# Patient Record
Sex: FEMALE | Race: WHITE | Employment: UNEMPLOYED | URBAN - METROPOLITAN AREA
[De-identification: names, ages, dates, MRNs, and addresses within clinical notes are randomized per-mention and may not be internally consistent; named-entity substitution may affect disease eponyms.]

---

## 2017-01-03 ENCOUNTER — TELEPHONE (OUTPATIENT)
Dept: SURGERY | Facility: CLINIC | Age: 47
End: 2017-01-03

## 2017-01-03 NOTE — TELEPHONE ENCOUNTER
Spoke to patient who states she was unable to get full quantity of percocet Rx from pharmacy, that they were only able to dispense 60 tablets. Patient requesting remainder of percocet Rx, #30 for pick-up.   Advised patient of 48 hour refill policy, to which

## 2017-01-05 ENCOUNTER — TELEPHONE (OUTPATIENT)
Dept: SURGERY | Facility: CLINIC | Age: 47
End: 2017-01-05

## 2017-01-05 DIAGNOSIS — M54.16 RIGHT LUMBAR RADICULOPATHY: Primary | ICD-10-CM

## 2017-01-05 RX ORDER — CYCLOBENZAPRINE HCL 10 MG
10 TABLET ORAL 3 TIMES DAILY PRN
Qty: 90 TABLET | Refills: 0 | Status: SHIPPED | OUTPATIENT
Start: 2017-01-05 | End: 2017-01-11

## 2017-01-05 NOTE — TELEPHONE ENCOUNTER
Per Dr. Baltazar Spencer patient needs to be seen by him prior to more narcotics being prescribed. Informed patient of need for OV, patient became tearful, states she is in severe pain and \"needs to be seen today\".  Advised patient she can be seen by Dr. Nato Britton

## 2017-01-06 ENCOUNTER — LAB ENCOUNTER (OUTPATIENT)
Dept: LAB | Age: 47
End: 2017-01-06
Attending: PHYSICIAN ASSISTANT
Payer: MEDICAID

## 2017-01-06 ENCOUNTER — TELEPHONE (OUTPATIENT)
Dept: SURGERY | Facility: CLINIC | Age: 47
End: 2017-01-06

## 2017-01-06 DIAGNOSIS — E11.40 TYPE 2 DIABETES MELLITUS WITH DIABETIC NEUROPATHY, UNSPECIFIED LONG TERM INSULIN USE STATUS: ICD-10-CM

## 2017-01-06 DIAGNOSIS — I10 ESSENTIAL HYPERTENSION: ICD-10-CM

## 2017-01-06 DIAGNOSIS — I10 UNSPECIFIED ESSENTIAL HYPERTENSION: ICD-10-CM

## 2017-01-06 DIAGNOSIS — E78.00 PURE HYPERCHOLESTEROLEMIA: Primary | ICD-10-CM

## 2017-01-06 DIAGNOSIS — E78.49 OTHER HYPERLIPIDEMIA: ICD-10-CM

## 2017-01-06 DIAGNOSIS — F11.90 NARCOTIC DRUG USE: ICD-10-CM

## 2017-01-06 DIAGNOSIS — E11.40 DIABETIC NEUROPATHY (HCC): ICD-10-CM

## 2017-01-06 LAB
ALBUMIN SERPL-MCNC: 3.8 G/DL (ref 3.5–4.8)
ALP LIVER SERPL-CCNC: 77 U/L (ref 39–100)
ALT SERPL-CCNC: 14 U/L (ref 14–54)
AST SERPL-CCNC: 6 U/L (ref 15–41)
BILIRUB SERPL-MCNC: 0.3 MG/DL (ref 0.1–2)
BUN BLD-MCNC: 9 MG/DL (ref 8–20)
CALCIUM BLD-MCNC: 9.1 MG/DL (ref 8.3–10.3)
CHLORIDE: 104 MMOL/L (ref 101–111)
CHOLEST SMN-MCNC: 237 MG/DL (ref ?–200)
CO2: 28 MMOL/L (ref 22–32)
CREAT BLD-MCNC: 0.69 MG/DL (ref 0.55–1.02)
EST. AVERAGE GLUCOSE BLD GHB EST-MCNC: 163 MG/DL (ref 68–126)
GLUCOSE BLD-MCNC: 164 MG/DL (ref 70–99)
HBA1C MFR BLD HPLC: 7.3 % (ref ?–5.7)
HDLC SERPL-MCNC: 34 MG/DL (ref 45–?)
HDLC SERPL: 6.97 {RATIO} (ref ?–4.44)
LDLC SERPL CALC-MCNC: 131 MG/DL (ref ?–130)
M PROTEIN MFR SERPL ELPH: 7.8 G/DL (ref 6.1–8.3)
NONHDLC SERPL-MCNC: 203 MG/DL (ref ?–130)
POTASSIUM SERPL-SCNC: 3.7 MMOL/L (ref 3.6–5.1)
SODIUM SERPL-SCNC: 140 MMOL/L (ref 136–144)
TRIGLYCERIDES: 359 MG/DL (ref ?–150)
VLDL: 72 MG/DL (ref 5–40)

## 2017-01-06 PROCEDURE — 80053 COMPREHEN METABOLIC PANEL: CPT

## 2017-01-06 PROCEDURE — 80307 DRUG TEST PRSMV CHEM ANLYZR: CPT

## 2017-01-06 PROCEDURE — 36415 COLL VENOUS BLD VENIPUNCTURE: CPT

## 2017-01-06 PROCEDURE — 83036 HEMOGLOBIN GLYCOSYLATED A1C: CPT

## 2017-01-06 PROCEDURE — 80061 LIPID PANEL: CPT

## 2017-01-06 NOTE — PATIENT INSTRUCTIONS
Refill policies:    • Allow 2 business days for refills; controlled substances may take longer.   • Contact your pharmacy at least 5 days prior to running out of medication and have them send an electronic request or submit request through the “request re your physician has recommended that you have a procedure or additional testing performed. [facility] will contact your insurance carrier to obtain pre-certification or prior authorization.     Unfortunately, [facility] has seen an increas [employer].     Medication:   Number of days you need to be off for the following medications:  • Aggrenox 10 days   • Agrylin (Anagrelide) 10 days   • Enbrel (Etanercept) 24 hours   • Fragmin (Dalteparin) 24 hours   • Pletal (Cilostazol) 7 days  • Pradaxa Contact your primary care physician if your blood sugar rises as you may require some medication adjustment.   It is normal to have increased pain at injection site for up to 3-5 days after procedure, you can use heat for the first 24 hours (20 minutes on 2 Once in place, the injection is carried out. After the injection, the needle is removed and an adhesive bandage is applied. What should I expect after the sacroiliac injection?   Immediately after the sacroiliac injection, you may experience complete pain lasts for six months or more. If three injections have not helped, you may be a candidate for a different procedure. It is important to attend follow up appointments to clearly communicate with your provider how you are feeling.   Will the sacroiliac inject with turning the head or looking up. What is the purpose of a facet injection? The long acting anti-inflammatory medication, or steroid, that is injected reduces inflammation and swelling of the facet joint and other tissues surrounding the joint.  This m several days. You should start noticing pain relief starting the third day or so. Can I go back to work the next day? You should be able to go back to work the next day. Again it is normal to feel some soreness or aching at the injection site.    How lo • Insomnia   • Headache   • Water retention   • Increased appetite   • Increased heart rate   • Abdominal cramping or bloating   You should contact our office immediately if you experience any side effects.   These effects usually resolve within a few day done either with the patient on their side for most neck injections and with the patient on their stomach for back injections. Occasionally other positions are used to optimize the X-ray view.  All patients receiving sedation are monitored with EKG, blood p injections? In a six-month period, most patients do not receive more than three injections. This is because the effect of the medication injected frequently lasts for six months or more.  If three injections have not helped you , you may be a candidate for

## 2017-01-06 NOTE — PROGRESS NOTES
Pain management agreement reviewed with patient, patient verbalized understanding. Copy given to patient and original sent to scan.  Patient sent to lab for urine test.

## 2017-01-06 NOTE — TELEPHONE ENCOUNTER
Patient seen in [facility] with Dr. Natali Curiel 1/6/17. Scheduled at that time. Lyrica denied, per TE 12/20/17.

## 2017-01-07 NOTE — PROGRESS NOTES
Name: Mei Ley   : 1970   DOS: 2017     Pain Clinic Follow Up Visit:   Mei Ley is a 51year old female who presents for recheck of her chronic low back pain. She was seen by my nurse practitioner for a consultation.   She was p tablet (500 mg total) by mouth 2 (two) times daily with meals. Disp: 60 tablet Rfl: 1   ValACYclovir HCl 1 G Oral Tab Take 1 g by mouth every 12 (twelve) hours. Disp:  Rfl:    Sertraline HCl 100 MG Oral Tab Take 1 tablet (100 mg total) by mouth daily.  1 1/ capsule 0      Sig: Take 1 capsule (4 mg total) by mouth 3 (three) times daily. Radiology orders and consultations:None    The patient indicates understanding of these issues and agrees to the plan. No Follow-up on file.     Antwon Hayes MD,

## 2017-01-08 LAB
6-ACETYLMORHINE CUTOFF 20 NG/ML: NOT DETECTED
7-AMINOCLONAZEPAM 40 NG/ML: NOT DETECTED
A-OH-ALPRAZOLM CUTOFF 20 NG/ML: NOT DETECTED
ALPRAZOLAM CUTOFF 40 NG/ML: NOT DETECTED
AMPHETAMINE CUTOFF 10 NG/ML: NOT DETECTED
BARBITURATES CUTOFF 200 NG/ML: NOT DETECTED
BENZOYLECGONINE  150 NG/ML: NOT DETECTED
BUPRENORPHINE CUTOFF 5 NG/ML: NOT DETECTED
CARISOPRODOL CUTOFF 100 NG/ML: NOT DETECTED
CODINE CUTOFF 40 NG/ML: NOT DETECTED
COLNAZEPAM CUTOFF 20 NG/ML: NOT DETECTED
CREATININE,URINE: 287.5 MG/DL
DIAZEPAM CUTOFF 50 NG/ML: NOT DETECTED
ETHYL-GLUCURONIDE 500 NG/ML: NOT DETECTED
FENTANYL CUTOFF 2 NG/ML: NOT DETECTED
HYDROCODONE CUTOFF 40 NG/ML: NOT DETECTED
HYDROMORPHONE CUTOFF 40 NG/ML: NOT DETECTED
LORAZEPAM CUTOFF 60 NG/ML: NOT DETECTED
MARIJUANA CUTOFF 20 NG/ML: PRESENT
MDA CUTOFF 200 NG/ML: NOT DETECTED
MDEA EVE CUTOFF 200 NG/ML: NOT DETECTED
MDMA-ECSTASY CUTOFF 200 NG/ML: NOT DETECTED
MEPERIDINE MET CUTOFF 50 NG/ML: NOT DETECTED
METHADONE CUTOFF 150 NG/ML: NOT DETECTED
METHAMPHETMN CUTOFF 400 NG/ML: NOT DETECTED
METHYLPHENIDATE (CUTOFF 100 NG/ML): NOT DETECTED
MIDAZOLAM CUTOFF 20 NG/ML: NOT DETECTED
MORHINE CUTOFF 20 NG/ML: NOT DETECTED
NORBUPRENORPHINE  20 NG/ML: NOT DETECTED
NORDIAZEPAM CUTOFF 50 NG/ML: NOT DETECTED
NORFENTANYL CUTOFF 2 NG/ML: NOT DETECTED
NORHYDRCODONE CUTOFF 100 NG/ML: NOT DETECTED
NOROXYCODONE CUTOFF 100 NG/ML: NOT DETECTED
NOROXYMORPHNE CUTOFF 100 NG/ML: NOT DETECTED
OXAZEPAM CUTOFF 50 NG/ML: PRESENT
OXYCODONE CUTOFF 40 NG/ML: NOT DETECTED
OXYMORPHONE CUTOFF 40 NG/ML: NOT DETECTED
PCP CUTOFF 25 NG/ML: NOT DETECTED
PHENTERMINE CUTOFF 100 NG/ML: NOT DETECTED
PROPOXYPHENE CUTOFF 300 NG/ML: NOT DETECTED
TAPENTADOL CUTOFF 100 NG/ML: NOT DETECTED
TAPENTADOL-O SULF 200 NG/ML: NOT DETECTED
TEMAZEPAM CUTOFF 50 NG/ML: NOT DETECTED
TRAMADOL CUTOFF 200 NG/ML: NOT DETECTED
ZOLPIDEM CUTOFF 20 NG/ML: NOT DETECTED

## 2017-01-09 ENCOUNTER — APPOINTMENT (OUTPATIENT)
Dept: GENERAL RADIOLOGY | Facility: HOSPITAL | Age: 47
End: 2017-01-09
Attending: ANESTHESIOLOGY
Payer: MEDICAID

## 2017-01-09 ENCOUNTER — SURGERY (OUTPATIENT)
Age: 47
End: 2017-01-09

## 2017-01-09 ENCOUNTER — HOSPITAL ENCOUNTER (OUTPATIENT)
Facility: HOSPITAL | Age: 47
Setting detail: HOSPITAL OUTPATIENT SURGERY
Discharge: HOME | End: 2017-01-09
Attending: ANESTHESIOLOGY | Admitting: ANESTHESIOLOGY
Payer: MEDICAID

## 2017-01-09 VITALS
TEMPERATURE: 99 F | DIASTOLIC BLOOD PRESSURE: 78 MMHG | RESPIRATION RATE: 18 BRPM | HEART RATE: 87 BPM | SYSTOLIC BLOOD PRESSURE: 114 MMHG | OXYGEN SATURATION: 95 %

## 2017-01-09 DIAGNOSIS — M46.1 SACROILIITIS (HCC): ICD-10-CM

## 2017-01-09 LAB — GLUCOSE BLD-MCNC: 165 MG/DL (ref 65–99)

## 2017-01-09 PROCEDURE — 99152 MOD SED SAME PHYS/QHP 5/>YRS: CPT | Performed by: ANESTHESIOLOGY

## 2017-01-09 PROCEDURE — 3E0U33Z INTRODUCTION OF ANTI-INFLAMMATORY INTO JOINTS, PERCUTANEOUS APPROACH: ICD-10-PCS | Performed by: ANESTHESIOLOGY

## 2017-01-09 PROCEDURE — 82962 GLUCOSE BLOOD TEST: CPT

## 2017-01-09 RX ORDER — LIDOCAINE HYDROCHLORIDE 10 MG/ML
INJECTION, SOLUTION EPIDURAL; INFILTRATION; INTRACAUDAL; PERINEURAL AS NEEDED
Status: DISCONTINUED | OUTPATIENT
Start: 2017-01-09 | End: 2017-01-09 | Stop reason: HOSPADM

## 2017-01-09 RX ORDER — SODIUM CHLORIDE, SODIUM LACTATE, POTASSIUM CHLORIDE, CALCIUM CHLORIDE 600; 310; 30; 20 MG/100ML; MG/100ML; MG/100ML; MG/100ML
100 INJECTION, SOLUTION INTRAVENOUS CONTINUOUS
Status: DISCONTINUED | OUTPATIENT
Start: 2017-01-09 | End: 2017-01-09

## 2017-01-09 RX ORDER — METHYLPREDNISOLONE ACETATE 40 MG/ML
INJECTION, SUSPENSION INTRA-ARTICULAR; INTRALESIONAL; INTRAMUSCULAR; SOFT TISSUE AS NEEDED
Status: DISCONTINUED | OUTPATIENT
Start: 2017-01-09 | End: 2017-01-09 | Stop reason: HOSPADM

## 2017-01-09 NOTE — OPERATIVE REPORT
[facility]  Operative Report  2017     Sofía Hawthorne. [address] Patient Status:  Hospital Outpatient Surgery    1970 MRN [id number]   Location [address] [place] [address] Attending Divina Long MD   [facility] Day # 0 PCP Gregorio Stovall sacroiliac joint. After the needle  positions were confirmed by AP and lateral views of fluoroscopy, 1 cc Omnipaque-240 was injected into the sacroiliac joint. There was a nice sacroiliac joint arthrogram revealed.  After that   methylprednisolone 80 mg wit

## 2017-01-09 NOTE — H&P
History & Physical Examination    Patient Name: Eldora Carrel  MRN: [id number]  CSN: [id number]  YOB: 1970    Pre-Operative Diagnosis:  [facility] [M46.1]    Present Illness: A 51year old female with low back pain is here for right Other and unspecified hyperlipidemia    • Depression    • Sleep apnea    • Type II or unspecified type diabetes mellitus without mention of complication, not stated as uncontrolled (HCC)      diet and exercise control   • Osteoarthritis    • PONV (postoper

## 2017-01-10 NOTE — PROGRESS NOTES
Quick Note:    Spoke to pt, aware of results and recommendations.  appt made for tomorrow with Bianca  ______

## 2017-01-11 ENCOUNTER — OFFICE VISIT (OUTPATIENT)
Dept: INTERNAL MEDICINE CLINIC | Facility: CLINIC | Age: 47
End: 2017-01-11

## 2017-01-11 VITALS
OXYGEN SATURATION: 98 % | SYSTOLIC BLOOD PRESSURE: 126 MMHG | HEIGHT: 69 IN | DIASTOLIC BLOOD PRESSURE: 84 MMHG | BODY MASS INDEX: 43.4 KG/M2 | RESPIRATION RATE: 16 BRPM | HEART RATE: 91 BPM | WEIGHT: 293 LBS | TEMPERATURE: 99 F

## 2017-01-11 DIAGNOSIS — F17.200 TOBACCO DEPENDENCE: ICD-10-CM

## 2017-01-11 DIAGNOSIS — E11.40 TYPE 2 DIABETES MELLITUS WITH DIABETIC NEUROPATHY, UNSPECIFIED LONG TERM INSULIN USE STATUS: Primary | ICD-10-CM

## 2017-01-11 DIAGNOSIS — F32.A DEPRESSION, UNSPECIFIED DEPRESSION TYPE: ICD-10-CM

## 2017-01-11 DIAGNOSIS — E78.5 HYPERLIPIDEMIA, UNSPECIFIED HYPERLIPIDEMIA TYPE: ICD-10-CM

## 2017-01-11 DIAGNOSIS — I10 ESSENTIAL HYPERTENSION: ICD-10-CM

## 2017-01-11 DIAGNOSIS — G47.30 SLEEP APNEA, UNSPECIFIED TYPE: ICD-10-CM

## 2017-01-11 PROCEDURE — 99214 OFFICE O/P EST MOD 30 MIN: CPT | Performed by: PHYSICIAN ASSISTANT

## 2017-01-11 RX ORDER — BLOOD-GLUCOSE METER
1 KIT MISCELLANEOUS 2 TIMES DAILY
Qty: 1 DEVICE | Refills: 0 | Status: SHIPPED | OUTPATIENT
Start: 2017-01-11 | End: 2018-01-11

## 2017-01-11 RX ORDER — SIMVASTATIN 40 MG
40 TABLET ORAL NIGHTLY
Qty: 90 TABLET | Refills: 0 | Status: SHIPPED | OUTPATIENT
Start: 2017-01-11 | End: 2017-08-18

## 2017-01-11 NOTE — PROGRESS NOTES
Jaime Cartagena is a 51year old female who presents for recheck of her diabetes.    Last HgbA1c was 7.3%, done on 01/06/17.   - taking metformin 500 mg bid daily as prescribed, tolerates meds well  - pt does not really monitor blood sugars at home  - exer total) by mouth 2 (two) times daily with meals. Disp: 180 tablet Rfl: 0   simvastatin 40 MG Oral Tab Take 1 tablet (40 mg total) by mouth nightly.  Disp: 90 tablet Rfl: 0   gabapentin (NEURONTIN) 300 MG Oral Cap Take 1 capsule (300 mg total) by mouth 3 (thr Comment: \"occasional smokes weed\"  Other Topics            Concern  Caffeine Concern        Yes    Comment:coffee, soda   Exercise                No      Patient Active Problem List:     Hyperlipidemia     Depression     TIANNA (obstructive sleep apnea) encouraged for pt  Recommendations are: continue present meds, check HgbA1C, check fasting lipids and CMP in 3 months, lose wgt with diet and exercise, refer to Ophthalmology ( pt to call with doctors name), check feet daily.   The patient indicates underst

## 2017-01-13 ENCOUNTER — TELEPHONE (OUTPATIENT)
Dept: SURGERY | Facility: CLINIC | Age: 47
End: 2017-01-13

## 2017-01-13 NOTE — TELEPHONE ENCOUNTER
----- Message from CRISTINA Rivera sent at 1/13/2017 10:29 AM CST -----  Drug screen is positive for marijuana, per Dr. Danie Calix we will no longer write narcotic medication for her.

## 2017-01-16 NOTE — TELEPHONE ENCOUNTER
Spoke with patient and advised of message below. Patient verbalized understanding. Patient also in need of rescheduling upcoming appointments. Patient rescheduled for 1/26/17 and 2/2/17. No further needs.

## 2017-01-19 ENCOUNTER — OFFICE VISIT (OUTPATIENT)
Dept: INTERNAL MEDICINE CLINIC | Facility: CLINIC | Age: 47
End: 2017-01-19

## 2017-01-19 VITALS
TEMPERATURE: 98 F | BODY MASS INDEX: 45 KG/M2 | DIASTOLIC BLOOD PRESSURE: 60 MMHG | WEIGHT: 293 LBS | SYSTOLIC BLOOD PRESSURE: 120 MMHG | HEART RATE: 92 BPM | RESPIRATION RATE: 16 BRPM

## 2017-01-19 DIAGNOSIS — R06.2 WHEEZING: ICD-10-CM

## 2017-01-19 DIAGNOSIS — R30.0 BURNING WITH URINATION: Primary | ICD-10-CM

## 2017-01-19 LAB
MULTISTIX LOT#: NORMAL NUMERIC
PH, URINE: 5.5 (ref 4.5–8)
SPECIFIC GRAVITY: >=1.03 (ref 1–1.03)
UROBILINOGEN,SEMI-QN: 0.2 MG/DL (ref 0–1.9)

## 2017-01-19 PROCEDURE — 99213 OFFICE O/P EST LOW 20 MIN: CPT | Performed by: PHYSICIAN ASSISTANT

## 2017-01-19 PROCEDURE — 81003 URINALYSIS AUTO W/O SCOPE: CPT | Performed by: PHYSICIAN ASSISTANT

## 2017-01-19 PROCEDURE — 87086 URINE CULTURE/COLONY COUNT: CPT | Performed by: PHYSICIAN ASSISTANT

## 2017-01-19 RX ORDER — FEXOFENADINE HCL 180 MG/1
180 TABLET ORAL DAILY
COMMUNITY

## 2017-01-19 RX ORDER — ALBUTEROL SULFATE 90 UG/1
2 AEROSOL, METERED RESPIRATORY (INHALATION) EVERY 4 HOURS PRN
Qty: 1 INHALER | Refills: 0 | Status: SHIPPED | OUTPATIENT
Start: 2017-01-19 | End: 2017-02-18

## 2017-01-19 NOTE — PROGRESS NOTES
Chivo Class is a 51year old female  Patient presents with:  Burning On Urination: abdominal pain. x 1 week   Wheezing: congestion, runny nose       HPI:   Pt here today with concerns of UTI.   Pt having dysuria, urinary urgency and frequency, started 135 tablet Rfl: 1      Patient Active Problem List:     Hyperlipidemia     Depression     TIANNA (obstructive sleep apnea)     Polycystic ovaries     Morbid obesity (HCC)     Anxiety     HTN (hypertension)     DM2 (diabetes mellitus, type 2) (HCC)     Tobacco CVA tenderness and suprapubic tenderness    ASSESSMENT AND PLAN:   Burning with urination  (primary encounter diagnosis)  Pt's UA analysis shows ketones otherwise negative for blood, wbc, nitrates ; Urine is sent out for culture and sensitivity.   Regarding

## 2017-01-26 ENCOUNTER — APPOINTMENT (OUTPATIENT)
Dept: GENERAL RADIOLOGY | Facility: HOSPITAL | Age: 47
End: 2017-01-26
Attending: ANESTHESIOLOGY
Payer: MEDICAID

## 2017-01-26 ENCOUNTER — SURGERY (OUTPATIENT)
Age: 47
End: 2017-01-26

## 2017-01-26 ENCOUNTER — TELEPHONE (OUTPATIENT)
Dept: SURGERY | Facility: CLINIC | Age: 47
End: 2017-01-26

## 2017-01-26 ENCOUNTER — HOSPITAL ENCOUNTER (OUTPATIENT)
Facility: HOSPITAL | Age: 47
Setting detail: HOSPITAL OUTPATIENT SURGERY
Discharge: HOME | End: 2017-01-26
Attending: ANESTHESIOLOGY | Admitting: ANESTHESIOLOGY
Payer: MEDICAID

## 2017-01-26 VITALS
DIASTOLIC BLOOD PRESSURE: 79 MMHG | TEMPERATURE: 98 F | OXYGEN SATURATION: 96 % | SYSTOLIC BLOOD PRESSURE: 161 MMHG | HEART RATE: 68 BPM | RESPIRATION RATE: 18 BRPM

## 2017-01-26 DIAGNOSIS — M47.816 LUMBAR FACET ARTHROPATHY: ICD-10-CM

## 2017-01-26 DIAGNOSIS — M47.816 LUMBAR FACET ARTHROPATHY: Primary | ICD-10-CM

## 2017-01-26 DIAGNOSIS — M47.819 SPONDYLOSIS WITHOUT MYELOPATHY: ICD-10-CM

## 2017-01-26 LAB — GLUCOSE BLD-MCNC: 178 MG/DL (ref 65–99)

## 2017-01-26 PROCEDURE — 99152 MOD SED SAME PHYS/QHP 5/>YRS: CPT | Performed by: ANESTHESIOLOGY

## 2017-01-26 PROCEDURE — 82962 GLUCOSE BLOOD TEST: CPT

## 2017-01-26 PROCEDURE — 3E0R33Z INTRODUCTION OF ANTI-INFLAMMATORY INTO SPINAL CANAL, PERCUTANEOUS APPROACH: ICD-10-PCS | Performed by: ANESTHESIOLOGY

## 2017-01-26 PROCEDURE — 3E0R3CZ INTRODUCTION OF REGIONAL ANESTHETIC INTO SPINAL CANAL, PERCUTANEOUS APPROACH: ICD-10-PCS | Performed by: ANESTHESIOLOGY

## 2017-01-26 PROCEDURE — 3E0S3KZ INTRODUCTION OF OTHER DIAGNOSTIC SUBSTANCE INTO EPIDURAL SPACE, PERCUTANEOUS APPROACH: ICD-10-PCS | Performed by: ANESTHESIOLOGY

## 2017-01-26 RX ORDER — SODIUM CHLORIDE, SODIUM LACTATE, POTASSIUM CHLORIDE, CALCIUM CHLORIDE 600; 310; 30; 20 MG/100ML; MG/100ML; MG/100ML; MG/100ML
100 INJECTION, SOLUTION INTRAVENOUS CONTINUOUS
Status: DISCONTINUED | OUTPATIENT
Start: 2017-01-26 | End: 2017-01-26

## 2017-01-26 RX ORDER — DEXAMETHASONE SODIUM PHOSPHATE 10 MG/ML
INJECTION, SOLUTION INTRAMUSCULAR; INTRAVENOUS AS NEEDED
Status: DISCONTINUED | OUTPATIENT
Start: 2017-01-26 | End: 2017-01-26 | Stop reason: HOSPADM

## 2017-01-26 RX ORDER — LIDOCAINE HYDROCHLORIDE 10 MG/ML
INJECTION, SOLUTION EPIDURAL; INFILTRATION; INTRACAUDAL; PERINEURAL AS NEEDED
Status: DISCONTINUED | OUTPATIENT
Start: 2017-01-26 | End: 2017-01-26 | Stop reason: HOSPADM

## 2017-01-26 NOTE — TELEPHONE ENCOUNTER
Please change procedure on 2/2 to left lumbar facet joint injection with sedation. Case request entered.

## 2017-01-26 NOTE — H&P
History & Physical Examination    Patient Name: Prince Mejia  MRN: [id number]  CSN: [id number]  YOB: 1970    Pre-Operative Diagnosis:  Lumbar facet arthropathy [M12.88]    Present Illness: A 51year old female with low back pain is here for tablet Rfl: 1 Taking       No current facility-administered medications for this encounter.     Allergies: No Known Allergies    Past Medical History   Diagnosis Date   • Unspecified essential hypertension    • Other and unspecified hyperlipidemia    • Depr alternatives with the patient/family. They understand and agree to proceed with plan of care. [ x ] I have reviewed the History and Physical done within the last 30 days. Any changes noted above.     CRISTINA Pressley

## 2017-01-26 NOTE — OPERATIVE REPORT
[facility]  Operative Report  2017     Katrina Forte [address] Patient Status:  Hospital Outpatient Surgery    1970 MRN [id number]   Location [address] [place] [address] Attending No att. providers found   Hosp Day # 0 PCP Bar toward the inferior aspect of the junction between the transverse process and pedicle of the right L2-L3 level atraumatically under fluoroscopic guidance. The needle was advanced into the anterior epidural space at this level.   The needle position was con

## 2017-02-02 ENCOUNTER — APPOINTMENT (OUTPATIENT)
Dept: GENERAL RADIOLOGY | Facility: HOSPITAL | Age: 47
End: 2017-02-02
Attending: ANESTHESIOLOGY
Payer: MEDICAID

## 2017-02-02 ENCOUNTER — HOSPITAL ENCOUNTER (OUTPATIENT)
Facility: HOSPITAL | Age: 47
Setting detail: HOSPITAL OUTPATIENT SURGERY
Discharge: HOME | End: 2017-02-02
Attending: ANESTHESIOLOGY | Admitting: ANESTHESIOLOGY
Payer: MEDICAID

## 2017-02-02 ENCOUNTER — SURGERY (OUTPATIENT)
Age: 47
End: 2017-02-02

## 2017-02-02 VITALS
SYSTOLIC BLOOD PRESSURE: 135 MMHG | HEART RATE: 89 BPM | OXYGEN SATURATION: 95 % | RESPIRATION RATE: 18 BRPM | DIASTOLIC BLOOD PRESSURE: 82 MMHG | TEMPERATURE: 98 F

## 2017-02-02 DIAGNOSIS — M47.819 SPONDYLOSIS WITHOUT MYELOPATHY: ICD-10-CM

## 2017-02-02 DIAGNOSIS — M47.816 LUMBAR FACET ARTHROPATHY: ICD-10-CM

## 2017-02-02 LAB — GLUCOSE BLD-MCNC: 186 MG/DL (ref 65–99)

## 2017-02-02 PROCEDURE — 99152 MOD SED SAME PHYS/QHP 5/>YRS: CPT | Performed by: ANESTHESIOLOGY

## 2017-02-02 PROCEDURE — 3E0U3BZ INTRODUCTION OF ANESTHETIC AGENT INTO JOINTS, PERCUTANEOUS APPROACH: ICD-10-PCS | Performed by: ANESTHESIOLOGY

## 2017-02-02 PROCEDURE — 82962 GLUCOSE BLOOD TEST: CPT

## 2017-02-02 PROCEDURE — 3E0U33Z INTRODUCTION OF ANTI-INFLAMMATORY INTO JOINTS, PERCUTANEOUS APPROACH: ICD-10-PCS | Performed by: ANESTHESIOLOGY

## 2017-02-02 RX ORDER — LIDOCAINE HYDROCHLORIDE 10 MG/ML
INJECTION, SOLUTION EPIDURAL; INFILTRATION; INTRACAUDAL; PERINEURAL AS NEEDED
Status: DISCONTINUED | OUTPATIENT
Start: 2017-02-02 | End: 2017-02-02 | Stop reason: HOSPADM

## 2017-02-02 RX ORDER — SODIUM CHLORIDE, SODIUM LACTATE, POTASSIUM CHLORIDE, CALCIUM CHLORIDE 600; 310; 30; 20 MG/100ML; MG/100ML; MG/100ML; MG/100ML
100 INJECTION, SOLUTION INTRAVENOUS CONTINUOUS
Status: DISCONTINUED | OUTPATIENT
Start: 2017-02-02 | End: 2017-02-02

## 2017-02-02 RX ORDER — METHYLPREDNISOLONE ACETATE 40 MG/ML
INJECTION, SUSPENSION INTRA-ARTICULAR; INTRALESIONAL; INTRAMUSCULAR; SOFT TISSUE AS NEEDED
Status: DISCONTINUED | OUTPATIENT
Start: 2017-02-02 | End: 2017-02-02 | Stop reason: HOSPADM

## 2017-02-02 NOTE — OPERATIVE REPORT
[facility]  Operative Report  2017     Nelly Peak Patient Status:  Hospital Outpatient Surgery    1970 MRN [id number]   Location [facility] Attending No att. providers found   Hosp Day # 0 PCP Tracey Mccollum into each corresponding facet joint at left L2-L3, L3-L4, L4-5 and L5-S1 level atraumatically under fluoroscopic guidance.   Following negative aspiration for CSF and blood, approximately 1 mL of 1% lidocaine with 20 mg of methylprednisolone was injected in

## 2017-02-02 NOTE — H&P
History & Physical Examination    Patient Name: Prince Mejia  MRN: [id number]  CSN: [id number]  YOB: 1970    Pre-Operative Diagnosis:  Spondylosis without myelopathy [M19.90]  Lumbar facet arthropathy [M12.88]    Present Illness: A 46 year total) by mouth daily.  1 1/2 tabs daily Disp: 135 tablet Rfl: 1 Taking       Current Facility-Administered Medications:  lactated ringers infusion 100 mL/hr Intravenous Continuous       Allergies: No Known Allergies    Past Medical History   Diagnosis Date to contact us in one week if her pain is not significantly better. We can consider adding on two additional lumbar epidural steroid injections, consider interlaminar approach given bilateral leg pain.    [ x ] I have discussed the risks and benefits and alt

## 2017-02-09 RX ORDER — GABAPENTIN 300 MG/1
CAPSULE ORAL
Qty: 90 CAPSULE | Refills: 0 | Status: SHIPPED | OUTPATIENT
Start: 2017-02-09 | End: 2017-03-29

## 2017-02-09 RX ORDER — DICLOFENAC SODIUM 75 MG/1
TABLET, DELAYED RELEASE ORAL
Qty: 60 TABLET | Refills: 0 | Status: SHIPPED | OUTPATIENT
Start: 2017-02-09 | End: 2017-03-20

## 2017-02-09 RX ORDER — LOSARTAN POTASSIUM AND HYDROCHLOROTHIAZIDE 25; 100 MG/1; MG/1
TABLET ORAL
Qty: 30 TABLET | Refills: 2 | Status: SHIPPED | OUTPATIENT
Start: 2017-02-09 | End: 2017-05-23

## 2017-02-09 NOTE — TELEPHONE ENCOUNTER
Medication: gabapentin    Date of last refill: 1/6/17  [place]    Last office visit: 1/6/17  Due back to clinic per last office note:  Not noted  Date next office visit scheduled:  3/6/17    Last OV note recommendati

## 2017-02-09 NOTE — TELEPHONE ENCOUNTER
Medication: Diclofenac 75 MG    Date of last refill: 12/16/16  [place]    Last office visit: 1/6/17  Due back to clinic per last office note:  Not noted  Date next office visit scheduled:  3/6/17    Last OV note recom

## 2017-02-13 ENCOUNTER — TELEPHONE (OUTPATIENT)
Dept: INTERNAL MEDICINE CLINIC | Facility: CLINIC | Age: 47
End: 2017-02-13

## 2017-02-13 NOTE — TELEPHONE ENCOUNTER
Incoming (mail or fax):  fax  Received from:  [facility]  Documentation given to:  triage

## 2017-02-14 ENCOUNTER — TELEPHONE (OUTPATIENT)
Dept: INTERNAL MEDICINE CLINIC | Facility: CLINIC | Age: 47
End: 2017-02-14

## 2017-02-14 NOTE — TELEPHONE ENCOUNTER
Incoming (mail or fax):  fax  Received from:  [facility]  Documentation given to:  triage

## 2017-02-14 NOTE — TELEPHONE ENCOUNTER
Received lab results done 2/13/17 at [facility].  Pro-BNP. Result in consult folder.

## 2017-02-14 NOTE — TELEPHONE ENCOUNTER
Incoming (mail or fax):  fax  Received from:  [facility]  Documentation given to:  Dr Brenna Johns

## 2017-02-20 ENCOUNTER — TELEPHONE (OUTPATIENT)
Dept: INTERNAL MEDICINE CLINIC | Facility: CLINIC | Age: 47
End: 2017-02-20

## 2017-02-20 NOTE — TELEPHONE ENCOUNTER
Incoming (mail or fax):  fax  Received from:  [facility]  Documentation given to:  Dr Mary Guzman

## 2017-02-28 RX ORDER — LANCETS 33 GAUGE
EACH MISCELLANEOUS
Refills: 0 | OUTPATIENT
Start: 2017-02-28

## 2017-03-02 ENCOUNTER — TELEPHONE (OUTPATIENT)
Dept: INTERNAL MEDICINE CLINIC | Facility: CLINIC | Age: 47
End: 2017-03-02

## 2017-03-02 DIAGNOSIS — I26.99 PULMONARY EMBOLISM, OTHER: Primary | ICD-10-CM

## 2017-03-02 NOTE — TELEPHONE ENCOUNTER
Pt called. States that she was in [facility] 2/13/17 through 2/16/17  and was supposed to F/U with pcp within 1-2 weeks. States that she was diagnosed with Pulmonary embolism and DVT.       Pt states that she is on Eliquis and has slight SOB with

## 2017-03-02 NOTE — TELEPHONE ENCOUNTER
Spoke with pt. Advised as below ok to wait till Monday to see Thezhaoneelima Caldwell. Scheduled ov 3/6/17. Ordered  Referral to Dr. Berna Berg. Gave pt contact information. Pt voiced understanding. Faxed request for medical records to [phone].

## 2017-03-03 NOTE — TELEPHONE ENCOUNTER
Incoming (mail or fax): Fax  Received from:  [facility]- When patient was in hospital.  Documentation given to:  Placed in Bianca's consult folder, since pt. Seeing Bianca 3/6/17.

## 2017-03-06 ENCOUNTER — OFFICE VISIT (OUTPATIENT)
Dept: INTERNAL MEDICINE CLINIC | Facility: CLINIC | Age: 47
End: 2017-03-06

## 2017-03-06 ENCOUNTER — HOSPITAL ENCOUNTER (OUTPATIENT)
Dept: MAMMOGRAPHY | Age: 47
Discharge: HOME | End: 2017-03-06
Attending: OBSTETRICS & GYNECOLOGY
Payer: MEDICAID

## 2017-03-06 ENCOUNTER — OFFICE VISIT (OUTPATIENT)
Dept: SURGERY | Facility: CLINIC | Age: 47
End: 2017-03-06

## 2017-03-06 VITALS
BODY MASS INDEX: 44.41 KG/M2 | HEART RATE: 84 BPM | WEIGHT: 293 LBS | SYSTOLIC BLOOD PRESSURE: 120 MMHG | RESPIRATION RATE: 16 BRPM | DIASTOLIC BLOOD PRESSURE: 80 MMHG | HEIGHT: 68 IN | TEMPERATURE: 98 F

## 2017-03-06 VITALS
DIASTOLIC BLOOD PRESSURE: 78 MMHG | HEIGHT: 69 IN | RESPIRATION RATE: 16 BRPM | SYSTOLIC BLOOD PRESSURE: 122 MMHG | BODY MASS INDEX: 43.4 KG/M2 | HEART RATE: 96 BPM | WEIGHT: 293 LBS

## 2017-03-06 DIAGNOSIS — M46.1 SACROILIITIS, NOT ELSEWHERE CLASSIFIED (HCC): Primary | ICD-10-CM

## 2017-03-06 DIAGNOSIS — I82.401 LEG DVT (DEEP VENOUS THROMBOEMBOLISM), ACUTE, RIGHT (HCC): ICD-10-CM

## 2017-03-06 DIAGNOSIS — I26.99 OTHER ACUTE PULMONARY EMBOLISM WITHOUT ACUTE COR PULMONALE (HCC): Primary | ICD-10-CM

## 2017-03-06 DIAGNOSIS — E11.40 TYPE 2 DIABETES MELLITUS WITH DIABETIC NEUROPATHY, UNSPECIFIED LONG TERM INSULIN USE STATUS: ICD-10-CM

## 2017-03-06 DIAGNOSIS — I10 ESSENTIAL HYPERTENSION: ICD-10-CM

## 2017-03-06 DIAGNOSIS — N61.1 ABSCESS OF RIGHT BREAST: ICD-10-CM

## 2017-03-06 DIAGNOSIS — Z12.31 VISIT FOR SCREENING MAMMOGRAM: ICD-10-CM

## 2017-03-06 PROBLEM — I82.409 LEG DVT (DEEP VENOUS THROMBOEMBOLISM), ACUTE (HCC): Status: ACTIVE | Noted: 2017-03-06

## 2017-03-06 PROCEDURE — 77067 SCR MAMMO BI INCL CAD: CPT

## 2017-03-06 PROCEDURE — 99213 OFFICE O/P EST LOW 20 MIN: CPT | Performed by: PHYSICIAN ASSISTANT

## 2017-03-06 PROCEDURE — 99214 OFFICE O/P EST MOD 30 MIN: CPT | Performed by: PHYSICIAN ASSISTANT

## 2017-03-06 RX ORDER — FLUCONAZOLE 150 MG/1
150 TABLET ORAL ONCE
Qty: 1 TABLET | Refills: 0 | Status: SHIPPED | OUTPATIENT
Start: 2017-03-06 | End: 2017-03-06

## 2017-03-06 RX ORDER — TIZANIDINE HYDROCHLORIDE 4 MG/1
4 CAPSULE, GELATIN COATED ORAL 3 TIMES DAILY
Qty: 90 CAPSULE | Refills: 2 | Status: SHIPPED | OUTPATIENT
Start: 2017-03-06

## 2017-03-06 RX ORDER — CEPHALEXIN 500 MG/1
500 CAPSULE ORAL 2 TIMES DAILY
Qty: 14 CAPSULE | Refills: 0 | Status: SHIPPED | OUTPATIENT
Start: 2017-03-06 | End: 2017-03-13

## 2017-03-06 NOTE — PATIENT INSTRUCTIONS
Refill policies:    • Allow 2 business days for refills; controlled substances may take longer.   • Contact your pharmacy at least 5 days prior to running out of medication and have them send an electronic request or submit request through the “request re your physician has recommended that you have a procedure or additional testing performed. [facility] will contact your insurance carrier to obtain pre-certification or prior authorization.     Unfortunately, [facility] has seen an increas

## 2017-03-06 NOTE — PROGRESS NOTES
Name: Ame Garcia   : 1970   DOS: 3/6/2017     Pain Clinic Follow Up Visit:   Patient presents with: Other: f/u,       Ame Garcia is a 51year old female who presents for recheck of chronic low back pain.  Pt is S/P 2017 right sacroi Rfl: 2   GABAPENTIN 300 MG Oral Cap TAKE 1 CAPSULE(300 MG) BY MOUTH THREE TIMES DAILY Disp: 90 capsule Rfl: 0   Fexofenadine HCl (ALLEGRA ALLERGY) 180 MG Oral Tab Take 180 mg by mouth daily.  Disp:  Rfl:    Blood Glucose Monitoring Suppl (FREESTYLE LITE) Do and we can offer repeat injections or RFAs. She would be a good candidate for RFA right and left SI and RFA left LMB. Our office will not be writing any narcotic prescriptions moving forward d/t + marijuana testing.  Pt reports only doing one time the day

## 2017-03-06 NOTE — PROGRESS NOTES
Smooth Hayden is a 51year old female  Patient presents with:  Hospital F/U      HPI:   Patient here today for hospital follow up  - she was admitted from 02/13/17 to 02/16/17  - states that she went in with symptoms of worsening chest pain and sob  - a 60 tablet Rfl: 0   LOSARTAN POTASSIUM-HCTZ 100-25 MG Oral Tab TAKE 1 TABLET BY MOUTH DAILY Disp: 30 tablet Rfl: 2   GABAPENTIN 300 MG Oral Cap TAKE 1 CAPSULE(300 MG) BY MOUTH THREE TIMES DAILY Disp: 90 capsule Rfl: 0   Fexofenadine HCl (ALLEGRA ALLERGY) 18 (obstructive sleep apnea)     Polycystic ovaries     Morbid obesity (HCC)     Anxiety     HTN (hypertension)     DM2 (diabetes mellitus, type 2) (HCC)     Tobacco dependence     Complex endometrial hyperplasia     Acute deep vein thrombosis (DVT) of axilla breast- started on keflex; referred to derm  Visit for screening mammogram: screening ordered     No orders of the defined types were placed in this encounter.        Meds & Refills for this Visit:  Signed Prescriptions Disp Refills    cephALEXin (Alexa Chang) 5

## 2017-03-08 ENCOUNTER — TELEPHONE (OUTPATIENT)
Dept: INTERNAL MEDICINE CLINIC | Facility: CLINIC | Age: 47
End: 2017-03-08

## 2017-03-08 ENCOUNTER — TELEPHONE (OUTPATIENT)
Dept: SURGERY | Facility: CLINIC | Age: 47
End: 2017-03-08

## 2017-03-08 NOTE — TELEPHONE ENCOUNTER
Please let pt know that i spoke with her pcp- PA and she does not want her to be on both the diclofenac and her blood thinner.  thanks

## 2017-03-08 NOTE — TELEPHONE ENCOUNTER
Received fax from [facility] re: Chest CT done 2/13/17. Impression: bilateral pulmonary emboli with Right lower lobe infiltrates; splenomegaly. To in basket.

## 2017-03-08 NOTE — TELEPHONE ENCOUNTER
Incoming (mail or fax): Fax  Received from:  [facility]  Documentation given to:  Triage-in Bianca's test results folder. (pt.  Follows with Bianca)

## 2017-03-10 ENCOUNTER — HOSPITAL ENCOUNTER (OUTPATIENT)
Dept: MAMMOGRAPHY | Age: 47
Discharge: HOME | End: 2017-03-10
Attending: OBSTETRICS & GYNECOLOGY
Payer: MEDICAID

## 2017-03-10 ENCOUNTER — HOSPITAL ENCOUNTER (OUTPATIENT)
Dept: ULTRASOUND IMAGING | Age: 47
Discharge: HOME | End: 2017-03-10
Attending: OBSTETRICS & GYNECOLOGY
Payer: MEDICAID

## 2017-03-10 DIAGNOSIS — R92.2 INCONCLUSIVE MAMMOGRAM: ICD-10-CM

## 2017-03-10 PROCEDURE — 77065 DX MAMMO INCL CAD UNI: CPT

## 2017-03-10 PROCEDURE — 77061 BREAST TOMOSYNTHESIS UNI: CPT

## 2017-03-10 PROCEDURE — 76642 ULTRASOUND BREAST LIMITED: CPT

## 2017-03-13 ENCOUNTER — APPOINTMENT (OUTPATIENT)
Dept: HEMATOLOGY/ONCOLOGY | Facility: HOSPITAL | Age: 47
End: 2017-03-13
Payer: MEDICAID

## 2017-03-14 ENCOUNTER — TELEPHONE (OUTPATIENT)
Dept: INTERNAL MEDICINE CLINIC | Facility: CLINIC | Age: 47
End: 2017-03-14

## 2017-03-14 NOTE — TELEPHONE ENCOUNTER
Incoming (mail or fax):  fax  Received from:  [facility]  Documentation given to:  Dr Bianka Barrow

## 2017-03-20 ENCOUNTER — OFFICE VISIT (OUTPATIENT)
Dept: HEMATOLOGY/ONCOLOGY | Facility: HOSPITAL | Age: 47
End: 2017-03-20
Attending: INTERNAL MEDICINE
Payer: MEDICAID

## 2017-03-20 VITALS
TEMPERATURE: 98 F | RESPIRATION RATE: 18 BRPM | WEIGHT: 293 LBS | SYSTOLIC BLOOD PRESSURE: 123 MMHG | HEIGHT: 69 IN | OXYGEN SATURATION: 96 % | HEART RATE: 94 BPM | DIASTOLIC BLOOD PRESSURE: 81 MMHG | BODY MASS INDEX: 43.4 KG/M2

## 2017-03-20 DIAGNOSIS — I82.401 LEG DVT (DEEP VENOUS THROMBOEMBOLISM), ACUTE, RIGHT (HCC): ICD-10-CM

## 2017-03-20 DIAGNOSIS — I26.99 OTHER ACUTE PULMONARY EMBOLISM WITHOUT ACUTE COR PULMONALE (HCC): ICD-10-CM

## 2017-03-20 DIAGNOSIS — I82.A11 ACUTE DEEP VEIN THROMBOSIS (DVT) OF AXILLARY VEIN OF RIGHT UPPER EXTREMITY (HCC): Primary | ICD-10-CM

## 2017-03-20 DIAGNOSIS — I82.B11 ACUTE THROMBOSIS OF RIGHT SUBCLAVIAN VEIN (HCC): ICD-10-CM

## 2017-03-20 PROCEDURE — 99215 OFFICE O/P EST HI 40 MIN: CPT | Performed by: INTERNAL MEDICINE

## 2017-03-20 NOTE — PROGRESS NOTES
Pt was recently in [facility] and was diagnosed with PE and DVT. Pt is taking eliquis 5 mg daily.

## 2017-03-20 NOTE — PROGRESS NOTES
Cancer Center Progress Note    Problem List:      Patient Active Problem List:     Hyperlipidemia     Depression     TIANNA (obstructive sleep apnea)     Polycystic ovaries     Morbid obesity ([facility]     Anxiety     HTN (hypertension)     DM2 (diabetes mellitus lymphadenopathy. Musculoskeletal: Negative for myalgias, arthralgias, muscle weakness. Allergies:     No Known Allergies    Medications:    apixaban 5 MG Oral Tab Take 1 tablet (5 mg total) by mouth 2 (two) times daily.  Disp: 90 tablet Rfl: 3   TiZAN supraclavicular, or axillary regions.       Labs:       Lab Results  Component Value Date   WBC 13.9* 05/18/2016   RBC 5.07 05/18/2016   HGB 14.8 05/18/2016   HCT 43.0 05/18/2016   MCV 84.8 05/18/2016   MCH 29.2 05/18/2016   MCHC 34.4 05/18/2016   RDW 12.6

## 2017-03-29 NOTE — TELEPHONE ENCOUNTER
Medication: Gabapentin    Date of last refill: 2/9/17  [place]    Last office visit: 3/6/17   Due back to clinic per last office note:  When off blood thinner  Date next office visit scheduled:  None Scheduled.     Palomo Reynoso

## 2017-03-30 ENCOUNTER — TELEPHONE (OUTPATIENT)
Dept: SURGERY | Facility: CLINIC | Age: 47
End: 2017-03-30

## 2017-03-30 RX ORDER — GABAPENTIN 300 MG/1
CAPSULE ORAL
Qty: 90 CAPSULE | Refills: 0 | Status: SHIPPED | OUTPATIENT
Start: 2017-03-30 | End: 2017-06-21

## 2017-03-30 NOTE — TELEPHONE ENCOUNTER
Received fax from pharmacy, asking to switch Tizanidine from caps to tabs due to patient insurance will not cover caps,  Discussed with Dr. Nneka Green, okay to switch to tabs, same dose  Called into pharmacy

## 2017-04-12 ENCOUNTER — TELEPHONE (OUTPATIENT)
Dept: INTERNAL MEDICINE CLINIC | Facility: CLINIC | Age: 47
End: 2017-04-12

## 2017-04-12 NOTE — TELEPHONE ENCOUNTER
Patient was phoned today and  left a message asking to reschedule today's No Show appointment. Advised that there may be a $50 charge assessed for patient's 2nd No Show within 365 days.

## 2017-04-19 RX ORDER — SERTRALINE HYDROCHLORIDE 100 MG/1
TABLET, FILM COATED ORAL
Qty: 135 TABLET | Refills: 0 | Status: SHIPPED | OUTPATIENT
Start: 2017-04-19 | End: 2017-07-22

## 2017-04-19 NOTE — TELEPHONE ENCOUNTER
Last OV pertinent to medication: 3/6/17 for  HFU  Last refill date: 8/17/16     #/refills: #135 + 1  When pt was asked to return for OV: NA  Upcoming appt/reason: No future appt

## 2017-05-23 RX ORDER — LOSARTAN POTASSIUM AND HYDROCHLOROTHIAZIDE 25; 100 MG/1; MG/1
TABLET ORAL
Qty: 30 TABLET | Refills: 1 | Status: SHIPPED | OUTPATIENT
Start: 2017-05-23 | End: 2017-08-04

## 2017-05-24 NOTE — TELEPHONE ENCOUNTER
Pt due for DM check. Remind to check labs on file at [facility]. FAST 10-12 hours prior. Please call pt to schedule appt.

## 2017-06-22 RX ORDER — TIZANIDINE 4 MG/1
4 TABLET ORAL EVERY 8 HOURS PRN
Qty: 90 TABLET | Refills: 0 | Status: SHIPPED | OUTPATIENT
Start: 2017-06-22

## 2017-06-22 RX ORDER — GABAPENTIN 300 MG/1
CAPSULE ORAL
Qty: 90 CAPSULE | Refills: 0 | Status: SHIPPED | OUTPATIENT
Start: 2017-06-22

## 2017-06-22 NOTE — TELEPHONE ENCOUNTER
Medication: Tizanidine 4 MG    Date of last refill: 3/6/17  [place]    Last office visit: 3/6/17  Due back to clinic per last office note:  Return for when she is off blood thinners for injections  Date next office vi

## 2017-06-22 NOTE — TELEPHONE ENCOUNTER
Medication: Gabapentin 300 MG    Date of last refill: 3/30/17  [place]    Last office visit: 3/6/17  Due back to clinic per last office note:  Return for when she is off blood thinners for injections  Date next office

## 2017-07-17 ENCOUNTER — PATIENT OUTREACH (OUTPATIENT)
Dept: INTERNAL MEDICINE CLINIC | Facility: CLINIC | Age: 47
End: 2017-07-17

## 2017-07-17 NOTE — PROGRESS NOTES
Called patient, and verbalized that Patient is Overdue for a DM Eye Exam 2017. Advised to call when Exam is done in order to collect info and update chart.

## 2017-07-20 ENCOUNTER — OFFICE VISIT (OUTPATIENT)
Dept: HEMATOLOGY/ONCOLOGY | Facility: HOSPITAL | Age: 47
End: 2017-07-20
Attending: INTERNAL MEDICINE
Payer: COMMERCIAL

## 2017-07-20 VITALS
HEART RATE: 87 BPM | OXYGEN SATURATION: 97 % | WEIGHT: 293 LBS | DIASTOLIC BLOOD PRESSURE: 71 MMHG | BODY MASS INDEX: 43.4 KG/M2 | HEIGHT: 69.02 IN | RESPIRATION RATE: 18 BRPM | TEMPERATURE: 98 F | SYSTOLIC BLOOD PRESSURE: 124 MMHG

## 2017-07-20 DIAGNOSIS — I82.A11 ACUTE DEEP VEIN THROMBOSIS (DVT) OF AXILLARY VEIN OF RIGHT UPPER EXTREMITY (HCC): Primary | ICD-10-CM

## 2017-07-20 DIAGNOSIS — I26.99 OTHER ACUTE PULMONARY EMBOLISM WITHOUT ACUTE COR PULMONALE (HCC): ICD-10-CM

## 2017-07-20 DIAGNOSIS — I82.B11 ACUTE THROMBOSIS OF RIGHT SUBCLAVIAN VEIN (HCC): ICD-10-CM

## 2017-07-20 PROCEDURE — 99213 OFFICE O/P EST LOW 20 MIN: CPT | Performed by: INTERNAL MEDICINE

## 2017-07-20 NOTE — PROGRESS NOTES
Cancer Center Progress Note    Problem List:      Patient Active Problem List:     Hyperlipidemia     Depression     TIANNA (obstructive sleep apnea)     Polycystic ovaries     Morbid obesity ([facility]     Anxiety     HTN (hypertension)     DM2 (diabetes mellitus No Known Allergies    Medications:    TiZANidine HCl 4 MG Oral Tab Take 1 tablet (4 mg total) by mouth every 8 (eight) hours as needed.  Disp: 90 tablet Rfl: 0   GABAPENTIN 300 MG Oral Cap TAKE 1 CAPSULE(300 MG) BY MOUTH THREE TIMES DAILY Disp: 90 capsu regions.       Labs:       Lab Results  Component Value Date   WBC 13.9 (H) 05/18/2016   RBC 5.07 05/18/2016   HGB 14.8 05/18/2016   HCT 43.0 05/18/2016   MCV 84.8 05/18/2016   MCH 29.2 05/18/2016   MCHC 34.4 05/18/2016   RDW 12.6 05/18/2016   .0 05/

## 2017-07-20 NOTE — PROGRESS NOTES
Patient here for 4m follow up. Patient c/o pain in legs and neuropathy. Patient denies SOB and swelling. Patient currently on Eliquis and tolerating, but the plan is to possibly switch to Xarelto.      Education Record    Learner:  Patient    Disease / Delaysho Ignacio

## 2017-07-24 RX ORDER — SERTRALINE HYDROCHLORIDE 100 MG/1
TABLET, FILM COATED ORAL
Qty: 135 TABLET | Refills: 0 | Status: SHIPPED | OUTPATIENT
Start: 2017-07-24

## 2017-07-24 NOTE — TELEPHONE ENCOUNTER
Don't believe she is a pt here anymore, her insurance changed I read somewhere  1 month only  Call pt to verify pls

## 2017-07-24 NOTE — TELEPHONE ENCOUNTER
Last OV pertinent to medication: 3/6/17  Last refill date: 4/19/17     #/refills: 135/0  When pt was asked to return for OV: none noted  Upcoming appt/reason: none

## 2017-08-04 RX ORDER — LOSARTAN POTASSIUM AND HYDROCHLOROTHIAZIDE 25; 100 MG/1; MG/1
TABLET ORAL
Qty: 30 TABLET | Refills: 0 | Status: SHIPPED | OUTPATIENT
Start: 2017-08-04

## 2017-08-17 RX ORDER — SIMVASTATIN 40 MG
TABLET ORAL
Qty: 90 TABLET | Refills: 0 | Status: CANCELLED | OUTPATIENT
Start: 2017-08-17

## 2017-08-18 ENCOUNTER — TELEPHONE (OUTPATIENT)
Dept: INTERNAL MEDICINE CLINIC | Facility: CLINIC | Age: 47
End: 2017-08-18

## 2017-08-18 RX ORDER — SIMVASTATIN 40 MG
40 TABLET ORAL NIGHTLY
Qty: 90 TABLET | Refills: 0 | Status: SHIPPED | OUTPATIENT
Start: 2017-08-18 | End: 2017-11-15

## 2017-08-23 NOTE — TELEPHONE ENCOUNTER
Last OV pertinent to medication: 3/6/17  Last refill date: 5/24/17   #180/refills: 0  When pt was asked to return for OV: No return date given  Upcoming appt/reason: No appointment scheduled.   LMTCB needs OV    Lab Results  Component Value Date    (

## 2017-08-30 ENCOUNTER — TELEPHONE (OUTPATIENT)
Dept: INTERNAL MEDICINE CLINIC | Facility: CLINIC | Age: 47
End: 2017-08-30

## 2017-08-30 NOTE — TELEPHONE ENCOUNTER
Incoming (mail or fax):  fax  Received from:   Patient- filled out medical record release  Documentation given to:  [facility] medical records bin

## 2017-08-30 NOTE — TELEPHONE ENCOUNTER
Medical records request rec'd from pt for entire chart. Forwarded to [facility] 8-30-17.   See med rec scan dated 8-30-17

## 2017-09-08 RX ORDER — LOSARTAN POTASSIUM AND HYDROCHLOROTHIAZIDE 25; 100 MG/1; MG/1
TABLET ORAL
Qty: 30 TABLET | Refills: 0 | OUTPATIENT
Start: 2017-09-08

## 2017-09-08 RX ORDER — LOSARTAN POTASSIUM AND HYDROCHLOROTHIAZIDE 25; 100 MG/1; MG/1
1 TABLET ORAL
Qty: 30 TABLET | Refills: 0 | OUTPATIENT
Start: 2017-09-08

## 2017-09-08 NOTE — TELEPHONE ENCOUNTER
Last OV pertinent to medication: 3/6/17 hospital F/U  Last refill date: 8/4/17     #/refills: 30 tabs + 0 refills   When pt was asked to return for OV: NO mention return visit needed   Upcoming appt/reason: No upcoming appt     Lab Results  Component Value

## 2017-11-16 RX ORDER — SIMVASTATIN 40 MG
TABLET ORAL
Qty: 30 TABLET | Refills: 0 | Status: SHIPPED | OUTPATIENT
Start: 2017-11-16

## 2017-12-12 RX ORDER — SIMVASTATIN 40 MG
TABLET ORAL
Qty: 30 TABLET | Refills: 0 | OUTPATIENT
Start: 2017-12-12

## 2018-01-15 ENCOUNTER — TELEPHONE (OUTPATIENT)
Dept: INTERNAL MEDICINE CLINIC | Facility: CLINIC | Age: 48
End: 2018-01-15

## 2018-01-15 NOTE — TELEPHONE ENCOUNTER
Incoming (mail or fax):  fax  Received from:  [facility]  Documentation given to:  Dr Mary Guzman

## 2021-01-15 ENCOUNTER — HOSPITAL ENCOUNTER (EMERGENCY)
Facility: HOSPITAL | Age: 51
Discharge: HOME | End: 2021-01-15
Attending: EMERGENCY MEDICINE
Payer: MEDICAID

## 2021-01-15 VITALS
TEMPERATURE: 98 F | HEART RATE: 82 BPM | RESPIRATION RATE: 18 BRPM | WEIGHT: 293 LBS | DIASTOLIC BLOOD PRESSURE: 90 MMHG | BODY MASS INDEX: 43.4 KG/M2 | OXYGEN SATURATION: 97 % | SYSTOLIC BLOOD PRESSURE: 164 MMHG | HEIGHT: 69 IN

## 2021-01-15 DIAGNOSIS — M54.40 BACK PAIN OF LUMBAR REGION WITH SCIATICA: Primary | ICD-10-CM

## 2021-01-15 LAB — GLUCOSE BLD-MCNC: 154 MG/DL (ref 70–99)

## 2021-01-15 PROCEDURE — 99283 EMERGENCY DEPT VISIT LOW MDM: CPT

## 2021-01-15 PROCEDURE — 82962 GLUCOSE BLOOD TEST: CPT

## 2021-01-15 RX ORDER — HYDROCODONE BITARTRATE AND ACETAMINOPHEN 5; 325 MG/1; MG/1
1-2 TABLET ORAL EVERY 6 HOURS PRN
Qty: 14 TABLET | Refills: 0 | Status: SHIPPED | OUTPATIENT
Start: 2021-01-15 | End: 2021-01-22

## 2021-01-15 RX ORDER — GLIPIZIDE 10 MG/1
10 TABLET ORAL
COMMUNITY

## 2021-01-15 RX ORDER — HYDROCODONE BITARTRATE AND ACETAMINOPHEN 5; 325 MG/1; MG/1
2 TABLET ORAL ONCE
Status: COMPLETED | OUTPATIENT
Start: 2021-01-15 | End: 2021-01-15

## 2021-01-15 RX ORDER — METHYLPREDNISOLONE 4 MG/1
TABLET ORAL
Qty: 1 PACKAGE | Refills: 0 | Status: SHIPPED | OUTPATIENT
Start: 2021-01-15

## 2021-01-15 RX ORDER — HYDROCHLOROTHIAZIDE 12.5 MG/1
12.5 CAPSULE, GELATIN COATED ORAL DAILY
COMMUNITY

## 2021-01-15 NOTE — ED PROVIDER NOTES
Patient Seen in: [facility] Emergency Department      History   Patient presents with:  Back Pain    Stated Complaint: lower back pain radiaing down leg, seen at [facility] x 2 for same this week    HPI/Subjective:   HPI    Patient is a 34-year-old female Alissa Mai MD at [facility] MAIN OR   • SINUS SURGERY    2008   • TRANSFORAMINAL LUMBAR EPIDURAL STEROID INJECTION MULTIPLE LEVEL Right 1/26/2017    Performed by Alissa Mai MD at [facility] MAIN OR                Social History    Tobacco Use      Smoking st be taken I Profen discussed this with patient. Will treat treat with a Medrol Dosepak. Short supply of Norco.  Follow-up with pain management. Attempted to refer to [facility] though does not work with her insurance.    evaluated and gave her pain m

## 2021-01-15 NOTE — CM/SW NOTE
Tried setting up an appointment for the patient with the [facility] and [facility] pain management but they both don't accept the patient's insurance [employer] medicaid. I printed out from the t website pain specialist for her to call.  I also encouraged for her to see h

## 2021-01-15 NOTE — ED INITIAL ASSESSMENT (HPI)
Patient to ED with c/o left lower back pain that radiates into her groin and down through the front of her leg. No known injury. Patient states she was seen at [facility] last night and was given flexeril without relief.

## (undated) DEVICE — REMOVER DURAPREP 3M

## (undated) DEVICE — GLOVE SURG SENSICARE SZ 7

## (undated) DEVICE — BANDAID COVERLET 1X3

## (undated) DEVICE — GLOVE SURG SENSICARE SZ 7-1/2

## (undated) DEVICE — PAIN TRAY: Brand: MEDLINE INDUSTRIES, INC.

## (undated) DEVICE — SYRINGE 10ML LL CONTRL SYRINGE

## (undated) DEVICE — NEEDLE SPINAL 22X5 405148

## (undated) NOTE — Clinical Note
05/11/2017      Arlen Thompson  [address]  6/22/1970      Dear Red,        [facility] records indicate that you have not completed the following medical service(s) per your physician's request.      Medical Service(s) to be complete

## (undated) NOTE — MR AVS SNAPSHOT
[address] [address] [phone]               Thank you for choosing us for your health care visit with CESARIO Slaughter.   We are glad to serve you and happy to provide you wi TAKE 1 TABLET BY MOUTH DAILY   Commonly known as:  HYZAAR           MetFORMIN HCl 1000 MG Tabs   Take 1 tablet (1,000 mg total) by mouth 2 (two) times daily with meals. What changed:  See the new instructions.    Commonly known as:  GLUCOPHAGE           o Lipid in 3 months    Complete by:  Apr 11, 2017 (Approximate)    Assoc Dx:  Hyperlipidemia, unspecified hyperlipidemia type [E78.5], Depression, unspecified depression type [F32.9], Essential hypertension [I10], Type 2 diabetes mellitus with diabetic neur your appointment immediately. However, if you are unsure about the requirements for authorization, please wait 5-7 days and then contact your physician's   office.  At that time, you will be provided with any authorization numbers or be assured that none ar

## (undated) NOTE — MR AVS SNAPSHOT
[address]  [address] [place] [address], [address] [place] [address] [phone]               Thank you for choosing us for your health care visit with Daysi Storm MD.  We are glad to serve you and happy to provide you with this family member will be picking up prescription, office must be given name of individual in advance and they must present an ID as well. ? The name of the person picking up your prescription must be documented in your chart.   Scheduling Tests    If your phy Do not eat or drink anything (including water) 6 hours prior to your procedure. ? Please take your morning blood pressure medication with a small sip of water. ? You are required to have a responsible adult drive you home after your procedure.  You may n In the event that your insurance does not authorize your procedure within 48 hours of the scheduled date, your procedure will be cancelled and rescheduled to a later date.    Please contact your insurance carrier to determine what your financial  responsibi The sacroiliac injection involves inserting a needle through skin and deeper tissues. The skin and deeper tissues are numbed with a local anesthetic before inserting the needle into the joint.  Some patients also receive sedation that can make the procedure You should be able to return to your work the next day. The most common symptom you may feel is soreness at the injection site. How long does the effect of the medication last?  The immediate effect is usually from the local anesthetic injected.  This wea You should contact our office immediately if you experience any side effects These effects usually resolve within a few days. Facet Joint Injections  What is a facet joint injection?   A facet joint injection is an injection of an anti-inflammatory medic to receive intravenous sedation that can make the procedure easier to tolerate. This type of sedation may cause amnesia and patients may not remember parts or all of the actual procedure. How is the facet joint injection performed?   It is done with the p lasts for six months or more. If three injections have not helped you very much, you may be a candidate for a different procedure. It is important to keep follow up appointment to clearly communicate with your provider how you are feeling.   Will the facet injections can also be used to identify a specific spinal nerve root level, or levels, that are the pain generators. What is actually injected? The transforaminal injection consists of a local anesthetic, and a steroid.     Will the transforaminal inject patients can still actively move their arm or leg. You may notice that your pain may be gone or diminished. This immediate effect is also due to the local anesthetic injected. This will last only for a few hours.  Your pain may return and you may have some complications. The most common side effect is pain from the actual injection once the local anesthetic wears off.  Other risks include but are not limited to: spinal puncture with headaches, infection, bleeding inside the epidural space, nerve damage and wo Take 1 tablet (500 mg total) by mouth 2 (two) times daily with meals. Commonly known as:  GLUCOPHAGE           oxyCODONE-acetaminophen  MG Tabs   Take 1 tablet by mouth every 8 (eight) hours as needed for Pain.  Please don't fill until 1/9/17   What

## (undated) NOTE — IP AVS SNAPSHOT
[facility] [place] [address] [place], [address] ~ [phone]                Discharge Summary   2/2/2017    Ms. Elliotta Ese [address]           Admission Information        Provider Department    2/2/2017 Lamon Babinski, MD  Pre-Op / Stop taking on:  4/11/2017    [facility]     [    ]    [    ]    [    ]    [    ]       oxyCODONE-acetaminophen  MG Tabs   Commonly known as:  PERCOCET        Take 1 tablet by mouth every 8 (eight) hours as needed for Pain.  Please don't fill unti 7. Should you develop a headache after treatment, rest, drink fluids (especially caffeine, if possible), and take mild analgesics. If the headache does not improve with the above treatment, contact the physician.   8. You may have temporary numbness of the (01/06/17)  14 (01/06/17)  0.3 (01/06/17)  7.8 (01/06/17)  3.8 (01/06/17)  140 (01/06/17)  3.7 (01/06/17)  104      Radiology Exams     None         Additional Information       We are concerned for your overall well being:    - If you are a smoker or hav

## (undated) NOTE — IP AVS SNAPSHOT
[facility] [place] [address] [place], [address] ~ [phone]                Discharge Summary   1/26/2017    Ms. Deepti Moran [address]           Admission Information        Provider Department    1/26/2017 Jenny Bauman MD [facility] Radha Salamanca     [    ]    [    ]    [    ]    [    ]       oxyCODONE-acetaminophen  MG Tabs   Commonly known as:  PERCOCET        Take 1 tablet by mouth every 8 (eight) hours as needed for Pain.  Please don't fill until 1/9/17   Stop taking on:  2 (especially caffeine, if possible), and take mild analgesics. If the headache does not improve with the above treatment, contact the physician. 8. You may have temporary numbness of the legs and buttocks and you may need assistance with walking.   9. Light 14 (01/06/17)  0.3 (01/06/17)  7.8 (01/06/17)  3.8 (01/06/17)  140 (01/06/17)  3.7 (01/06/17)  104      Radiology Exams     None         Additional Information       We are concerned for your overall well being:    - If you are a smoker or have smoked in t

## (undated) NOTE — LETTER
[facility]  Anish Mohan 60 [address] [facility], [address] [place] [address]    Consent for Operation    Date: __________________    Time: _______________    1.  I authorize the performance upon Kimberly A Dattilo the following operation:    Procedure(s):  RIGHT TRANSFORAMINA procedure has been videotaped, the surgeon will obtain the original videotape. The hospital will not be responsible for storage or maintenance of this tape.     6. For the purpose of advancing medical education, I consent to the admittance of observers to t STATEMENTS REQUIRING INSERTION OR COMPLETION WERE FILLED IN.     Signature of Patient:   ___________________________    When the patient is a minor or mentally incompetent to give consent:  Signature of person authorized to consent for patient: ____________ supplements, and pills I can buy without a prescription (including street drugs/illegal medications). Failure to inform my anesthesiologist about these medicines may increase my risk of anesthetic complications.   · If I am allergic to anything or have had Anesthesiologist Signature     Date   Time  I have discussed the procedure and information above with the patient (or patient’s representative) and answered their questions. The patient or their representative has agreed to have anesthesia services.     ___

## (undated) NOTE — IP AVS SNAPSHOT
[facility] [place] [address] [place], [address] ~ [phone]                Discharge Summary   1/9/2017    Ms. Derrell [address]           Admission Information        Provider Department    1/9/2017 Qian Andrade MD  Pre-Op / Alissa Mai     [    ]    [    ]    [    ]    [    ]       Sertraline HCl 100 MG Tabs   Commonly known as:  ZOLOFT        Take 1 tablet (100 mg total) by mouth daily.  1 1/2 tabs daily    Bobo Nixon     [    ]    [    ]    [    ]    [    ]       T if you do not feel the urge to urinate, make sure that you do within eight hours of the procedure. Contact the physician if you are unable to urinate. This is a temporary condition.   11.  Since you may have some weakness in your legs or arms for a few hour - If you don’t have insurance, [facility] has partnered with [facility] to help you get signed up for insurance coverage.   [facility] is a Federal Navigator program that can help with your Affordable Care Act cover

## (undated) NOTE — MR AVS SNAPSHOT
[address]  [address] [address]  [phone]               Thank you for choosing us for your health care visit with CESARIO Holder.   We are glad to serve you and happy to provide you wi Order:   Op Referral To [facility] Hematology/Oncology Group    Fatuma Cano MD   [address] [facility],[address] [place] [address]   Phone:  [phone]   Fax:  [phone]             Meghna Knag, [address] [facility] Your physician has referred you to a specialist.  Your physician or the clinic staff will provide you with the phone number you should call to schedule your appointment.      If you are confident that your benefit plan will not require a referral or authori Medical Issues Discussed Today     Leg DVT (deep venous thromboembolism), acute    Pulmonary embolism without acute cor pulmonale    Abscess of right breast          Instructions and Information about Your Health     None      Allergies as of Mar 06, 2017 Take 1 capsule (4 mg total) by mouth 3 (three) times daily. Commonly known as:  ZANAFLEX           ValACYclovir HCl 1 G Tabs   Take 1 g by mouth every 12 (twelve) hours.    Commonly known as:  VALTREX                Where to Get Your Medications      Thes You don’t need to join a gym. Home exercises work great.  Put more priority on exercise in your life                    Visit [facility] online at  [email or web address]

## (undated) NOTE — MR AVS SNAPSHOT
[address] [address] [phone]               Thank you for choosing us for your health care visit with CESARIO Kendrick.   We are glad to serve you and happy to provide you wi Inhale 2 puffs into the lungs every 4 (four) hours as needed for Wheezing. Commonly known as:  PROAIR HFA           ALLEGRA ALLERGY 180 MG Tabs   Generic drug:  Fexofenadine HCl   Take 180 mg by mouth daily.    What changed:  Another medication with the s Urine Dip, auto without Micro    Complete by:  As directed    Assoc Dx:  Burning with urination [R30.0]           Urine Culture, Routine [E]    Complete by:  Jan 19, 2017 (Approximate)    Assoc Dx:  Burning with urination [R30.0]                 Results o

## (undated) NOTE — LETTER
Zainab Gonsales [address] [address]  [place], [address] [facility]    Consent for Operation  Date: __________________                                Time: _______________    1.  I authorize the performance upon [employer] the following operation:  Procedur procedure has been videotaped, the surgeon will obtain the original videotape. The hospital will not be responsible for storage or maintenance of this tape.     6. For the purpose of advancing medical education, I consent to the admittance of observers to t STATEMENTS REQUIRING INSERTION OR COMPLETION WERE FILLED IN.     Signature of Patient:   ___________________________    When the patient is a minor or mentally incompetent to give consent:  Signature of person authorized to consent for patient: ____________

## (undated) NOTE — MR AVS SNAPSHOT
[facility], [address] [phone]               Thank you for choosing us for your health care visit with Lex Redding PA-C.   We are glad to serve you and happy to provide you with this lopez working days from the date of this order for the referral to be processed. Please wait 3 working days to schedule your appointment unless notified.       [facility] in [facility] and [facility] [address] & [address], [address] or by on call physicians. ? By law, narcotics cannot be faxed or phoned into your pharmacy. The prescription must be signed by the provider, picked up in our office and physically brought to the pharmacy.   A 30 day supply with no refills is the maximum al approved and is a COVERED benefit. Although the [facility] staff does its due diligence, the insurance carrier gives the disclaimer that \"Although the procedure is authorized, this does not guarantee payment. \"    Ultimately the patient is responsible for payme TiZANidine HCl 4 MG Caps   Take 1 capsule (4 mg total) by mouth 3 (three) times daily. Commonly known as:  ZANAFLEX           ValACYclovir HCl 1 G Tabs   Take 1 g by mouth every 12 (twelve) hours.    Commonly known as:  VALTREX                Where to [employer] track your progress   You don’t need to join a gym. Home exercises work great.  Put more priority on exercise in your life                    Visit [facility] online at  [email or web address]

## (undated) NOTE — MR AVS SNAPSHOT
After Visit Summary   3/20/2017    Lyn Casas    MRN: [id number]           Diagnoses this Visit     Acute deep vein thrombosis (DVT) of axillary vein of right upper extremity ([address]    -  Primary     Acute thrombosis of right subclavian vein (H Appointment with Valeria Smallwood at [address] [place] ([phone] [address] [facility] [address]       Thursday July 20, 2017 10:30 AM     Appointment with Bebeto Manuel at [address]